# Patient Record
Sex: FEMALE | Race: WHITE | Employment: UNEMPLOYED | URBAN - METROPOLITAN AREA
[De-identification: names, ages, dates, MRNs, and addresses within clinical notes are randomized per-mention and may not be internally consistent; named-entity substitution may affect disease eponyms.]

---

## 2023-01-04 ENCOUNTER — PREP FOR PROCEDURE (OUTPATIENT)
Dept: SURGERY | Age: 28
End: 2023-01-04

## 2023-01-04 ENCOUNTER — OFFICE VISIT (OUTPATIENT)
Dept: SURGERY | Age: 28
End: 2023-01-04
Payer: COMMERCIAL

## 2023-01-04 VITALS
DIASTOLIC BLOOD PRESSURE: 80 MMHG | SYSTOLIC BLOOD PRESSURE: 138 MMHG | BODY MASS INDEX: 43.87 KG/M2 | HEART RATE: 91 BPM | WEIGHT: 257 LBS | OXYGEN SATURATION: 97 % | HEIGHT: 64 IN

## 2023-01-04 DIAGNOSIS — R22.2 MASS ON BACK: ICD-10-CM

## 2023-01-04 PROCEDURE — 99244 OFF/OP CNSLTJ NEW/EST MOD 40: CPT | Performed by: SURGERY

## 2023-01-04 NOTE — PROGRESS NOTES
H&P/Consult Note/Progress Note/Office Note:   Nithya Lemus  MRN: [id number]  :1995  Age:27 y.o.    HPI: Nithya Lemus is a 32 y.o. female who was referred by Dr. Miranda Renteria for evaluation of a back soft tissue mass. She reports the mass been present for a few years  It is growing significantly in size. Nothing in particular makes it better or worse. No prior excisions. No blood thinners          History reviewed. No pertinent past medical history. No past surgical history on file. No current outpatient medications on file. No current facility-administered medications for this visit. ALLERGIES:  Patient has no known allergies. Social History     Socioeconomic History    Marital status:      Spouse name: None    Number of children: None    Years of education: None    Highest education level: None   Tobacco Use    Smoking status: Never    Smokeless tobacco: Never     Social History     Tobacco Use   Smoking Status Never   Smokeless Tobacco Never     No family history on file. ROS: The patient has no difficulty with chest pain or shortness of breath. No fever or chills. Comprehensive review of systems was otherwise unremarkable except as noted above. Physical Exam:   /80   Pulse 91   Ht 5' 4\" (1.626 m)   Wt 257 lb (116.6 kg)   SpO2 97%   BMI 44.11 kg/m²   Vitals:    23 0903   BP: 138/80   Pulse: 91   SpO2: 97%   Weight: 257 lb (116.6 kg)   Height: 5' 4\" (1.626 m)     [unfilled]  [unfilled]    Constitutional: Alert, oriented, cooperative patient in no acute distress; appears stated age    Eyes:Sclera are clear. EOMs intact  ENMT: no external lesions gross hearing normal; no obvious neck masses, no ear or lip lesions, nares normal  CV: RRR. Normal perfusion  Resp: No JVD. Breathing is  non-labored; no audible wheezing.       Back soft tissue mass located along the right lateral mid back    GI: soft and non-distended     Musculoskeletal: unremarkable with normal function. No embolic signs or cyanosis. Neuro:  Oriented; moves all 4; no focal deficits  Psychiatric: normal affect and mood, no memory impairment    Recent vitals (if inpt):  @IPVITALS(24:)@    Amount and/or Complexity of Data Reviewed and Analyzed:  I reviewed and analyzed all of the unique labs and radiologic studies that are shown below as well as any that are in the HPI, and any that are in the expanded problem list below  *Each unique test, order, or document contributes to the combination of 2 or combination of 3 in Category 1 below. For this visit I also reviewed old records and prior notes. No results for input(s): WBC, HGB, PLT, NA, K, CL, CO2, BUN, CREA, GLU, INR, APTT, ALT, AML, AML, LCAD, PCO2, PO2, HCO3 in the last 72 hours. Invalid input(s): PTP, TBIL, TBILI, CBIL, SGOT, GPT, AP, LPSE, NH4, TROPT, TROIQ,  PH  Review of most recent CBC  No results found for: WBC, HGB, HCT, MCV, PLT    Review of most recent BMP  No results found for: NA, K, CL, CO2, BUN, CREATININE, GLUCOSE, CALCIUM     Review of most recent LFTs (and lipase if done)  No results found for: ALT, AST, GGT, ALKPHOS, BILITOT  No results found for: LIPASE    No results found for: INR, APTT, LCAD    Review of most recent HgbA1c  No results found for: LABA1C    Nutritional assessment screen for wound healing issues:  No results found for: LABPROT, LABALBU    @lastcovr@    Xray Result (most recent):  No results found for this or any previous visit from the past 3650 days. CT Result (most recent):  No results found for this or any previous visit from the past 3650 days. US Result (most recent):  No results found for this or any previous visit from the past 3650 days. Admission date (for inpatients): (Not on file)   * No surgery found *  * No surgery found *        ASSESSMENT/PLAN:  [unfilled]  Active Problems:    * No active hospital problems. *  Resolved Problems:    * No resolved hospital problems.  * Patient Active Problem List    Diagnosis Date Noted    Mass on back 01/04/2023     Priority: Medium          Number and Complexity of Problems addressed and   Risks of complications and/or morbidity of management        Back soft tissue mass treated along the right mid lateral back  Informed consent was obtained for excision of the back soft tissue mass  We will send this to pathology for review. Procedure risks were discussed including bleeding, infection, recurrence, seroma, reaction medications, etc.. All her questions were answered. She is in favor proceeding. We will schedule this for soon. Level of MDM (2/3 elements below)  Number and Complexity of Problems Addressed Amount and/or Complexity of Data to be Reviewed and Analyzed  *Each unique test, order, or document contributes to the combination of 2 or combination of 3 in Category 1 below. Risk of Complications and/or Morbidity or Mortality of pt Management     40036  51129 SF Minimal  ?1self-limited or minor problem Minimal or none Minimal risk of morbidity from additional diagnostic testing or Rx   80650  22993 Low Low  ? 2or more self-limited or minor problems;    or  ? 1stable chronic illness;    or  ?1acute, uncomplicated illness or injury   Limited  (Must meet the requirements of at least 1 of the 2 categories)  Category 1: Tests and documents   ? Any combination of 2 from the following:  ?Review of prior external note(s) from each unique source*;  ?review of the result(s) of each unique test*;   ?ordering of each unique test*    or   Category 2: Assessment requiring an independent historian(s)  (For the categories of independent interpretation of tests and discussion of management or test interpretation, see moderate or high) Low risk of morbidity from additional diagnostic testing or treatment     95269  41908 Mod Moderate  ? 1or more chronic illnesses with exacerbation, progression, or side effects of treatment;    or  ?2or more stable chronic illnesses;    or  ?1undiagnosed new problem with uncertain prognosis;    or  ?1acute illness with systemic symptoms;    or  ?1acute complicated injury   Moderate  (Must meet the requirements of at least 1 out of 3 categories)  Category 1: Tests, documents, or independent historian(s)  ? Any combination of 3 from the following:   ?Review of prior external note(s) from each unique source*;  ?Review of the result(s) of each unique test*;  ?Ordering of each unique test*;  ?Assessment requiring an independent historian(s)    or  Category 2: Independent interpretation of tests   ? Independent interpretation of a test performed by another physician/other qualified health care professional (not separately reported);     or  Category 3: Discussion of management or test interpretation  ? Discussion of management or test interpretation with external physician/other qualified health care professional/appropriate source (not separately reported)   Moderate risk of morbidity from additional diagnostic testing or treatment  Examples only:  ?Prescription drug management   ? Decision regarding minor surgery with identified patient or procedure risk factors  ? Decision regarding elective major surgery without identified patient or procedure risk factors   ? Diagnosis or treatment significantly limited by social determinants of health       11486  42506 High High  ? 1or more chronic illnesses with severe exacerbation, progression, or side effects of treatment;    or  ?1 acute or chronic illness or injury that poses a threat to life or bodily function   Extensive  (Must meet the requirements of at least 2 out of 3 categories)  Category 1: Tests, documents, or independent historian(s)  ? Any combination of 3 from the following:   ?Review of prior external note(s) from each unique source*;  ?Review of the result(s) of each unique test*;   ?Ordering of each unique test*;   ?Assessment requiring an independent historian(s)    or Category 2: Independent interpretation of tests   ? Independent interpretation of a test performed by another physician/other qualified health care professional (not separately reported);     or  Category 3: Discussion of management or test interpretation  ? Discussion of management or test interpretation with external physician/other qualified health care professional/appropriate source (not separately reported)   High risk of morbidity from additional diagnostic testing or treatment  Examples only:  ?Drug therapy requiring intensive monitoring for toxicity  ? Decision regarding elective major surgery with identified patient or procedure risk factors  ? Decision regarding emergency major surgery  ? Decision regarding hospitalization  ? Decision not to resuscitate or to de-escalate care because of poor prognosis             I have personally performed a face-to-face diagnostic evaluation and management  service on this patient. I have independently seen the patient. I have independently obtained the above history from the patient/family. I have independently examined the patient with above findings. I have independently reviewed data/labs for this patient and developed the above plan of care (MDM).       Signed: Joaquin Basilio MD  1/4/2023    9:13 AM

## 2023-01-05 RX ORDER — SODIUM CHLORIDE 9 MG/ML
INJECTION, SOLUTION INTRAVENOUS PRN
Status: CANCELLED | OUTPATIENT
Start: 2023-01-05

## 2023-01-05 RX ORDER — SODIUM CHLORIDE 0.9 % (FLUSH) 0.9 %
5-40 SYRINGE (ML) INJECTION EVERY 12 HOURS SCHEDULED
Status: CANCELLED | OUTPATIENT
Start: 2023-01-05

## 2023-01-05 RX ORDER — SODIUM CHLORIDE 0.9 % (FLUSH) 0.9 %
5-40 SYRINGE (ML) INJECTION PRN
Status: CANCELLED | OUTPATIENT
Start: 2023-01-05

## 2023-01-05 NOTE — PERIOP NOTE
Patient verified name and . Order for consent  found in EHR and matches case posting; patient verifies procedure. Type 1B surgery, PAT phone assessment complete. Orders  received. Labs per surgeon: none  Labs per anesthesia protocol: none indicated    Patient answered medical/surgical history questions at their best of ability. All prior to admission medications documented in [facility]. Patient instructed to take the following medications the day of surgery according to anesthesia guidelines with a small sip of water: none On the day before surgery please take Acetaminophen 1000mg in the morning and then again before bed. You may substitute for Tylenol 650 mg. Hold all vitamins 7 days prior to surgery and NSAIDS 5 days prior to surgery. Prescription meds to hold:multivitamin  Patient instructed on the following:    > Arrive at [facility], time of arrival to be called the day before by 1700  > NPO after midnight, unless otherwise indicated, including gum, mints, and ice chips  > Responsible adult must drive patient to the hospital, stay during surgery, and patient will need supervision 24 hours after anesthesia  > Use antibacterial soap in shower the night before surgery and on the morning of surgery  > All piercings must be removed prior to arrival.    > Leave all valuables (money and jewelry) at home but bring insurance card and ID on DOS.   > You may be required to pay a deductible or co-pay on the day of your procedure. You can pre-pay by calling [phone] if your surgery is at the [facility] or [phone] if your surgery is at the [facility]. > Do not wear make-up, nail polish, lotions, cologne, perfumes, powders, or oil on skin. Artificial nails are not permitted.

## 2023-01-09 ENCOUNTER — ANESTHESIA EVENT (OUTPATIENT)
Dept: SURGERY | Age: 28
End: 2023-01-09
Payer: COMMERCIAL

## 2023-01-10 ENCOUNTER — HOSPITAL ENCOUNTER (OUTPATIENT)
Age: 28
Setting detail: OUTPATIENT SURGERY
Discharge: HOME | End: 2023-01-10
Attending: SURGERY | Admitting: SURGERY
Payer: COMMERCIAL

## 2023-01-10 ENCOUNTER — ANESTHESIA (OUTPATIENT)
Dept: SURGERY | Age: 28
End: 2023-01-10
Payer: COMMERCIAL

## 2023-01-10 VITALS
WEIGHT: 257 LBS | BODY MASS INDEX: 43.87 KG/M2 | HEIGHT: 64 IN | DIASTOLIC BLOOD PRESSURE: 85 MMHG | OXYGEN SATURATION: 99 % | SYSTOLIC BLOOD PRESSURE: 138 MMHG | HEART RATE: 70 BPM | TEMPERATURE: 97 F | RESPIRATION RATE: 18 BRPM

## 2023-01-10 DIAGNOSIS — R22.2 MASS ON BACK: Primary | ICD-10-CM

## 2023-01-10 LAB — HCG UR QL: NEGATIVE

## 2023-01-10 PROCEDURE — 81025 URINE PREGNANCY TEST: CPT

## 2023-01-10 PROCEDURE — 3600000004 HC SURGERY LEVEL 4 BASE: Performed by: SURGERY

## 2023-01-10 PROCEDURE — 3600000014 HC SURGERY LEVEL 4 ADDTL 15MIN: Performed by: SURGERY

## 2023-01-10 PROCEDURE — 2580000003 HC RX 258

## 2023-01-10 PROCEDURE — 88304 TISSUE EXAM BY PATHOLOGIST: CPT

## 2023-01-10 PROCEDURE — 2709999900 HC NON-CHARGEABLE SUPPLY: Performed by: SURGERY

## 2023-01-10 PROCEDURE — 21931 EXC BACK LES SC 3 CM/>: CPT | Performed by: SURGERY

## 2023-01-10 PROCEDURE — 88305 TISSUE EXAM BY PATHOLOGIST: CPT

## 2023-01-10 PROCEDURE — 2580000003 HC RX 258: Performed by: ANESTHESIOLOGY

## 2023-01-10 PROCEDURE — 7100000010 HC PHASE II RECOVERY - FIRST 15 MIN: Performed by: SURGERY

## 2023-01-10 PROCEDURE — 6360000002 HC RX W HCPCS: Performed by: SURGERY

## 2023-01-10 PROCEDURE — 2500000003 HC RX 250 WO HCPCS: Performed by: SURGERY

## 2023-01-10 PROCEDURE — 99024 POSTOP FOLLOW-UP VISIT: CPT | Performed by: SURGERY

## 2023-01-10 PROCEDURE — 6370000000 HC RX 637 (ALT 250 FOR IP): Performed by: ANESTHESIOLOGY

## 2023-01-10 PROCEDURE — 7100000000 HC PACU RECOVERY - FIRST 15 MIN: Performed by: SURGERY

## 2023-01-10 PROCEDURE — 3700000000 HC ANESTHESIA ATTENDED CARE: Performed by: SURGERY

## 2023-01-10 PROCEDURE — 3700000001 HC ADD 15 MINUTES (ANESTHESIA): Performed by: SURGERY

## 2023-01-10 PROCEDURE — 6360000002 HC RX W HCPCS

## 2023-01-10 PROCEDURE — 6360000002 HC RX W HCPCS: Performed by: ANESTHESIOLOGY

## 2023-01-10 PROCEDURE — 2500000003 HC RX 250 WO HCPCS

## 2023-01-10 RX ORDER — PROPOFOL 10 MG/ML
INJECTION, EMULSION INTRAVENOUS PRN
Status: DISCONTINUED | OUTPATIENT
Start: 2023-01-10 | End: 2023-01-10 | Stop reason: SDUPTHER

## 2023-01-10 RX ORDER — MIDAZOLAM HYDROCHLORIDE 2 MG/2ML
2 INJECTION, SOLUTION INTRAMUSCULAR; INTRAVENOUS
Status: COMPLETED | OUTPATIENT
Start: 2023-01-10 | End: 2023-01-10

## 2023-01-10 RX ORDER — HYDROMORPHONE HYDROCHLORIDE 2 MG/ML
0.5 INJECTION, SOLUTION INTRAMUSCULAR; INTRAVENOUS; SUBCUTANEOUS
Status: DISCONTINUED | OUTPATIENT
Start: 2023-01-10 | End: 2023-01-10 | Stop reason: HOSPADM

## 2023-01-10 RX ORDER — SODIUM CHLORIDE 0.9 % (FLUSH) 0.9 %
5-40 SYRINGE (ML) INJECTION EVERY 12 HOURS SCHEDULED
Status: DISCONTINUED | OUTPATIENT
Start: 2023-01-10 | End: 2023-01-10 | Stop reason: HOSPADM

## 2023-01-10 RX ORDER — LIDOCAINE HYDROCHLORIDE 10 MG/ML
1 INJECTION, SOLUTION INFILTRATION; PERINEURAL
Status: DISCONTINUED | OUTPATIENT
Start: 2023-01-10 | End: 2023-01-10 | Stop reason: HOSPADM

## 2023-01-10 RX ORDER — SODIUM CHLORIDE, SODIUM LACTATE, POTASSIUM CHLORIDE, CALCIUM CHLORIDE 600; 310; 30; 20 MG/100ML; MG/100ML; MG/100ML; MG/100ML
INJECTION, SOLUTION INTRAVENOUS CONTINUOUS
Status: DISCONTINUED | OUTPATIENT
Start: 2023-01-10 | End: 2023-01-10 | Stop reason: HOSPADM

## 2023-01-10 RX ORDER — SODIUM CHLORIDE 9 MG/ML
INJECTION, SOLUTION INTRAVENOUS PRN
Status: DISCONTINUED | OUTPATIENT
Start: 2023-01-10 | End: 2023-01-10 | Stop reason: SDUPTHER

## 2023-01-10 RX ORDER — ALBUTEROL SULFATE 2.5 MG/3ML
2.5 SOLUTION RESPIRATORY (INHALATION)
Status: DISCONTINUED | OUTPATIENT
Start: 2023-01-10 | End: 2023-01-10 | Stop reason: HOSPADM

## 2023-01-10 RX ORDER — LIDOCAINE HYDROCHLORIDE 10 MG/ML
INJECTION, SOLUTION INFILTRATION; PERINEURAL PRN
Status: DISCONTINUED | OUTPATIENT
Start: 2023-01-10 | End: 2023-01-10 | Stop reason: HOSPADM

## 2023-01-10 RX ORDER — ACETAMINOPHEN 500 MG
1000 TABLET ORAL ONCE
Status: COMPLETED | OUTPATIENT
Start: 2023-01-10 | End: 2023-01-10

## 2023-01-10 RX ORDER — OXYCODONE HYDROCHLORIDE 5 MG/1
10 TABLET ORAL PRN
Status: DISCONTINUED | OUTPATIENT
Start: 2023-01-10 | End: 2023-01-10 | Stop reason: HOSPADM

## 2023-01-10 RX ORDER — LABETALOL HYDROCHLORIDE 5 MG/ML
10 INJECTION, SOLUTION INTRAVENOUS
Status: DISCONTINUED | OUTPATIENT
Start: 2023-01-10 | End: 2023-01-10 | Stop reason: HOSPADM

## 2023-01-10 RX ORDER — OXYCODONE HYDROCHLORIDE 5 MG/1
5 TABLET ORAL PRN
Status: DISCONTINUED | OUTPATIENT
Start: 2023-01-10 | End: 2023-01-10 | Stop reason: HOSPADM

## 2023-01-10 RX ORDER — ONDANSETRON 2 MG/ML
INJECTION INTRAMUSCULAR; INTRAVENOUS PRN
Status: DISCONTINUED | OUTPATIENT
Start: 2023-01-10 | End: 2023-01-10 | Stop reason: SDUPTHER

## 2023-01-10 RX ORDER — HYDROMORPHONE HCL 110MG/55ML
0.5 PATIENT CONTROLLED ANALGESIA SYRINGE INTRAVENOUS EVERY 5 MIN PRN
Status: DISCONTINUED | OUTPATIENT
Start: 2023-01-10 | End: 2023-01-10 | Stop reason: HOSPADM

## 2023-01-10 RX ORDER — ONDANSETRON 2 MG/ML
4 INJECTION INTRAMUSCULAR; INTRAVENOUS
Status: DISCONTINUED | OUTPATIENT
Start: 2023-01-10 | End: 2023-01-10 | Stop reason: HOSPADM

## 2023-01-10 RX ORDER — LIDOCAINE HYDROCHLORIDE 20 MG/ML
INJECTION, SOLUTION EPIDURAL; INFILTRATION; INTRACAUDAL; PERINEURAL PRN
Status: DISCONTINUED | OUTPATIENT
Start: 2023-01-10 | End: 2023-01-10 | Stop reason: SDUPTHER

## 2023-01-10 RX ORDER — SODIUM CHLORIDE 0.9 % (FLUSH) 0.9 %
5-40 SYRINGE (ML) INJECTION PRN
Status: DISCONTINUED | OUTPATIENT
Start: 2023-01-10 | End: 2023-01-10 | Stop reason: HOSPADM

## 2023-01-10 RX ORDER — DEXAMETHASONE SODIUM PHOSPHATE 10 MG/ML
INJECTION INTRAMUSCULAR; INTRAVENOUS PRN
Status: DISCONTINUED | OUTPATIENT
Start: 2023-01-10 | End: 2023-01-10 | Stop reason: SDUPTHER

## 2023-01-10 RX ORDER — SODIUM CHLORIDE 9 MG/ML
INJECTION, SOLUTION INTRAVENOUS PRN
Status: DISCONTINUED | OUTPATIENT
Start: 2023-01-10 | End: 2023-01-10 | Stop reason: HOSPADM

## 2023-01-10 RX ORDER — DEXTROSE MONOHYDRATE 100 MG/ML
INJECTION, SOLUTION INTRAVENOUS CONTINUOUS PRN
Status: DISCONTINUED | OUTPATIENT
Start: 2023-01-10 | End: 2023-01-10 | Stop reason: HOSPADM

## 2023-01-10 RX ORDER — IPRATROPIUM BROMIDE AND ALBUTEROL SULFATE 2.5; .5 MG/3ML; MG/3ML
1 SOLUTION RESPIRATORY (INHALATION)
Status: DISCONTINUED | OUTPATIENT
Start: 2023-01-10 | End: 2023-01-10 | Stop reason: HOSPADM

## 2023-01-10 RX ORDER — HYDROCODONE BITARTRATE AND ACETAMINOPHEN 5; 325 MG/1; MG/1
1-2 TABLET ORAL EVERY 8 HOURS PRN
Qty: 28 TABLET | Refills: 0 | Status: SHIPPED | OUTPATIENT
Start: 2023-01-10 | End: 2023-01-17

## 2023-01-10 RX ORDER — HYDROMORPHONE HCL 110MG/55ML
0.25 PATIENT CONTROLLED ANALGESIA SYRINGE INTRAVENOUS EVERY 5 MIN PRN
Status: DISCONTINUED | OUTPATIENT
Start: 2023-01-10 | End: 2023-01-10 | Stop reason: HOSPADM

## 2023-01-10 RX ORDER — HYDROMORPHONE HYDROCHLORIDE 2 MG/ML
0.25 INJECTION, SOLUTION INTRAMUSCULAR; INTRAVENOUS; SUBCUTANEOUS
Status: DISCONTINUED | OUTPATIENT
Start: 2023-01-10 | End: 2023-01-10 | Stop reason: HOSPADM

## 2023-01-10 RX ADMIN — Medication 2000 MG: at 12:38

## 2023-01-10 RX ADMIN — PROPOFOL 200 MG: 10 INJECTION, EMULSION INTRAVENOUS at 12:31

## 2023-01-10 RX ADMIN — SODIUM CHLORIDE, POTASSIUM CHLORIDE, SODIUM LACTATE AND CALCIUM CHLORIDE: 600; 310; 30; 20 INJECTION, SOLUTION INTRAVENOUS at 11:27

## 2023-01-10 RX ADMIN — MIDAZOLAM HYDROCHLORIDE 2 MG: 1 INJECTION, SOLUTION INTRAMUSCULAR; INTRAVENOUS at 12:26

## 2023-01-10 RX ADMIN — FENTANYL CITRATE 50 MCG: 50 INJECTION INTRAMUSCULAR; INTRAVENOUS at 12:29

## 2023-01-10 RX ADMIN — DEXAMETHASONE SODIUM PHOSPHATE 4 MG: 10 INJECTION INTRAMUSCULAR; INTRAVENOUS at 12:50

## 2023-01-10 RX ADMIN — PHENYLEPHRINE HYDROCHLORIDE 100 MCG: 10 INJECTION INTRAVENOUS at 12:41

## 2023-01-10 RX ADMIN — ONDANSETRON 4 MG: 2 INJECTION INTRAMUSCULAR; INTRAVENOUS at 12:50

## 2023-01-10 RX ADMIN — ACETAMINOPHEN 1000 MG: 500 TABLET ORAL at 11:28

## 2023-01-10 RX ADMIN — LIDOCAINE HYDROCHLORIDE 100 MG: 20 INJECTION, SOLUTION EPIDURAL; INFILTRATION; INTRACAUDAL; PERINEURAL at 12:31

## 2023-01-10 RX ADMIN — FENTANYL CITRATE 50 MCG: 50 INJECTION INTRAMUSCULAR; INTRAVENOUS at 12:48

## 2023-01-10 ASSESSMENT — PAIN - FUNCTIONAL ASSESSMENT: PAIN_FUNCTIONAL_ASSESSMENT: 0-10

## 2023-01-10 NOTE — DISCHARGE INSTRUCTIONS
Dressings/Wound Care  OK to leave your incisional dressings alone until follow-up visit. Try to keep incisions as dry as possible to lower risk of infection. Activity  No heavy lifting (>5lbs) for 6 weeks to reduce risk of developing a hernia in the incisions. No driving until you are off pain meds for 24hrs and have no pain with movements associated with driving. Pain prescription (Norco) electronically sent to your pharmacy    Follow-up with Dr Juan Antonio Alexandre nurse practitioner Tootie Handley in 13 days in the office on a Monday. See Dr Terrell Ron as needed after that visit. [address], [address]  (Call for an appt time unless one is already made for you by discharge nurse which is preferred -->[phone])    Diet  Resume Regular Diet     MEDICATION INTERACTION:    During your procedure you potentially received a medication or medications which may reduce the effectiveness of oral contraceptives. Please consider other forms of contraception for 1 month following your procedure if you are currently using oral contraceptives as your primary form of birth control. In addition to this, we recommend continuing your oral contraceptive as prescribed, unless otherwise instructed by your physician, during this time. ACTIVITY  As tolerated and as directed by your doctor. DIET  Clear liquids until no nausea or vomiting, then light diet for the first day. Advance to regular diet on second day, unless your doctor orders otherwise. If nausea and vomiting continues, call your doctor. PAIN  Take pain medication as directed by your doctor. Call your doctor if pain is NOT relieved by medication. CALL YOUR DOCTOR IF   Excessive bleeding that does not stop after holding pressure over the area. Temperature of 101 degrees F or above. Excessive redness, swelling or bruising, and/or green or yellow, smelly discharge from incision.     After general anesthesia or intravenous sedation, for 24 hours or while taking prescription Narcotics:  Limit your activities  A responsible adult needs to be with you for the next 24 hours  Do not drive and operate hazardous machinery  Do not make important personal or business decisions  Do not drink alcoholic beverages  If you have not urinated within 8 hours after discharge, and you are experiencing discomfort from urinary retention, please go to the nearest ED. If you have sleep apnea and have a CPAP machine, please use it for all naps and sleeping. Please use caution when taking narcotics and any of your home medications that may cause drowsiness. *  Please give a list of your current medications to your Primary Care Provider. *  Please update this list whenever your medications are discontinued, doses are      changed, or new medications (including over-the-counter products) are added. *  Please carry medication information at all times in case of emergency situations. These are general instructions for a healthy lifestyle:  No smoking/ No tobacco products/ Avoid exposure to second hand smoke  Surgeon General's Warning:  Quitting smoking now greatly reduces serious risk to your health. Obesity, smoking, and sedentary lifestyle greatly increases your risk for illness  A healthy diet, regular physical exercise & weight monitoring are important for maintaining a healthy lifestyle    You may be retaining fluid if you have a history of heart failure or if you experience any of the following symptoms:  Weight gain of 3 pounds or more overnight or 5 pounds in a week, increased swelling in our hands or feet or shortness of breath while lying flat in bed. Please call your doctor as soon as you notice any of these symptoms; do not wait until your next office visit.

## 2023-01-10 NOTE — OP NOTE
[facility]  OPERATIVE REPORT    Name:  Chad Peters  MR#:  [id number]  :  1995  ACCOUNT #:  [de-identified]  DATE OF SERVICE:  01/10/2023    PREOPERATIVE DIAGNOSIS:  Soft tissue mass, right lateral back/flank. POSTOPERATIVE DIAGNOSIS:  Soft tissue mass, right lateral back/flank. PROCEDURE PERFORMED:  Excision of subcutaneous soft tissue mass of the back, 4 cm (CPT 84907). SURGEON:  Carlita Higgins MD    ASSISTANT:  None. ANESTHESIA:  General.    COMPLICATIONS:  None. SPECIMENS REMOVED:  Sent to Pathology for review. IMPLANTS:  None. ESTIMATED BLOOD LOSS:  Less than 5 mL. PROCEDURE:  The patient was taken to the operating room, placed in supine position. After adequate anesthesia was given, she was repositioned with her right side up on her flank with a bean bag. Time-out protocol was followed. She was given prophylactic antibiotics. Her right flank and lateral back were prepped and draped in sterile fashion with multiple layers of ChloraPrep. The ChloraPrep was allowed to dry. The patient was opened through an oblique incision over the soft tissue mass. The soft tissue mass was 4 cm in size and completely dissected from the subcutaneous space. It was removed intact and sent to Pathology for review. Irrigation was used. Hemostasis was confirmed. There was no evidence of gross or palpable pathology following the excision. The mass was clinically consistent with lipoma. A local anesthetic was given consisting of 0.25% Marcaine. Hemostasis was confirmed. Irrigation was used. The incision was closed in layers with a deep layer of interrupted 3-0 Vicryl sutures. The skin was closed with clips. Sterile dressing was placed. The patient tolerated the procedure well. There were no immediate complications.       Shreya White MD [id number]  D:  01/10/2023 13:06  T:  01/10/2023 13:33  JOB #:  [id number]

## 2023-01-10 NOTE — ANESTHESIA POSTPROCEDURE EVALUATION
Department of Anesthesiology  Postprocedure Note    Patient: Yair Burr  MRN: [id number]  YOB: 1995  Date of evaluation: 1/10/2023      Procedure Summary     Date: 01/10/23 Room / Location:  [facility] OR 01 / [facility]    Anesthesia Start: 1218 Anesthesia Stop: 3876    Procedure: EXCISION RIGHT BACK MASS (Right: Back) Diagnosis:       Mass of skin of back      (Mass of skin of back [R22.2])    Surgeons: Rajan Cohen MD Responsible Provider: Kelly Yi MD    Anesthesia Type: general ASA Status: 3          Anesthesia Type: No value filed.     Chelsey Phase I: Chelsey Score: 9    Chelsey Phase II:        Anesthesia Post Evaluation    Patient location during evaluation: PACU  Patient participation: complete - patient participated  Level of consciousness: awake and alert  Pain score: 0  Airway patency: patent  Nausea & Vomiting: no nausea and no vomiting  Complications: no  Cardiovascular status: hemodynamically stable  Respiratory status: acceptable, nonlabored ventilation and spontaneous ventilation  Hydration status: euvolemic  Comments: BP (!) 156/93   Pulse 89   Temp 97 °F (36.1 °C) (Skin)   Resp 18   Ht 5' 4\" (1.626 m)   Wt 257 lb (116.6 kg)   LMP 11/24/2022   SpO2 98%   BMI 44.11 kg/m²     Multimodal analgesia pain management approach

## 2023-01-10 NOTE — ANESTHESIA PROCEDURE NOTES
Airway  Date/Time: 1/10/2023 12:34 PM  Urgency: elective    Airway not difficult    General Information and Staff    Patient location during procedure: OR  Performed: resident/CRNA     Indications and Patient Condition  Indications for airway management: anesthesia  Spontaneous ventilation: present  Sedation level: deep  Preoxygenated: yes  Patient position: sniffing  MILS not maintained throughout  Mask difficulty assessment: vent by bag mask    Final Airway Details  Final airway type: supraglottic airway      Successful airway: oropharyngeal  Size 4     Number of attempts at approach: 1  Ventilation between attempts: supraglottic airway  Number of other approaches attempted: 0    no

## 2023-01-10 NOTE — H&P
H&P/Consult Note/Progress Note/Office Note:   Alondra Kruger  MRN: [id number]  PWQ:5/41/8555  Age:27 y.o.    HPI: Alondra Kruger is a 32 y.o. female who is here for excision of a soft tissue mass of the back on 1/10/23. Prior to excision she was referred by Dr. Tate Ortega for evaluation of a back soft tissue mass. She reported the mass been present for a few years  It was growing significantly in size. Nothing in particular made it better or worse. No prior excisions. No blood thinners          Past Medical History:   Diagnosis Date    Adverse effect of anesthesia     woke up during wisdom teeth removal     Past Surgical History:   Procedure Laterality Date    WISDOM TOOTH EXTRACTION  2012     No current facility-administered medications for this encounter. Current Outpatient Medications   Medication Sig    Multiple Vitamins-Minerals (MULTIVITAMIN WOMEN PO) Take by mouth daily     ALLERGIES:  Patient has no known allergies. Social History     Socioeconomic History    Marital status:      Spouse name: None    Number of children: None    Years of education: None    Highest education level: None   Tobacco Use    Smoking status: Former    Smokeless tobacco: Never    Tobacco comments:     Quit vaping   Substance and Sexual Activity    Alcohol use: Not Currently    Drug use: Never     Social History     Tobacco Use   Smoking Status Former   Smokeless Tobacco Never   Tobacco Comments    Quit vaping     History reviewed. No pertinent family history. ROS: The patient has no difficulty with chest pain or shortness of breath. No fever or chills. Comprehensive review of systems was otherwise unremarkable except as noted above.     Physical Exam:   Ht 5' 4\" (1.626 m)   Wt 257 lb (116.6 kg)   LMP 11/24/2022   BMI 44.11 kg/m²   Vitals:    01/05/23 1120   Weight: 257 lb (116.6 kg)   Height: 5' 4\" (1.626 m)     [unfilled]  [unfilled]    Constitutional: Alert, oriented, cooperative patient in no acute distress; appears stated age    Eyes:Sclera are clear. EOMs intact  ENMT: no external lesions gross hearing normal; no obvious neck masses, no ear or lip lesions, nares normal  CV: RRR. Normal perfusion  Resp: No JVD. Breathing is  non-labored; no audible wheezing. Back soft tissue mass located along the right lateral mid back    GI: soft and non-distended     Musculoskeletal: unremarkable with normal function. No embolic signs or cyanosis. Neuro:  Oriented; moves all 4; no focal deficits  Psychiatric: normal affect and mood, no memory impairment    Recent vitals (if inpt):  @IPVITALS(24:)@    Amount and/or Complexity of Data Reviewed and Analyzed:  I reviewed and analyzed all of the unique labs and radiologic studies that are shown below as well as any that are in the HPI, and any that are in the expanded problem list below  *Each unique test, order, or document contributes to the combination of 2 or combination of 3 in Category 1 below. For this visit I also reviewed old records and prior notes. No results for input(s): WBC, HGB, PLT, NA, K, CL, CO2, BUN, CREA, GLU, INR, APTT, ALT, AML, AML, LCAD, PCO2, PO2, HCO3 in the last 72 hours. Invalid input(s): PTP, TBIL, TBILI, CBIL, SGOT, GPT, AP, LPSE, NH4, TROPT, TROIQ,  PH  Review of most recent CBC  No results found for: WBC, HGB, HCT, MCV, PLT    Review of most recent BMP  No results found for: NA, K, CL, CO2, BUN, CREATININE, GLUCOSE, CALCIUM     Review of most recent LFTs (and lipase if done)  No results found for: ALT, AST, GGT, ALKPHOS, BILITOT  No results found for: LIPASE    No results found for: INR, APTT, LCAD    Review of most recent HgbA1c  No results found for: LABA1C    Nutritional assessment screen for wound healing issues:  No results found for: LABPROT, LABALBU    @lastcovr@    Xray Result (most recent):  No results found for this or any previous visit from the past 3650 days.     CT Result (most recent):  No results found for this or any previous visit from the past 3650 days. US Result (most recent):  No results found for this or any previous visit from the past 3650 days. Admission date (for inpatients): (Not on file)   * No surgery date entered *  * No surgery found *        ASSESSMENT/PLAN:  [unfilled]  Principal Problem: Mass on back  Resolved Problems:    * No resolved hospital problems. *     Patient Active Problem List    Diagnosis Date Noted    Mass on back 01/04/2023     Priority: Medium          Number and Complexity of Problems addressed and   Risks of complications and/or morbidity of management        Back soft tissue mass along the right mid lateral back  She is here for excision of a soft tissue mass of the back on 1/10/23  We will send to specimen to path for review      Informed consent was obtained for excision of the back soft tissue mass  We will send this to pathology for review. Procedure risks were discussed including bleeding, infection, recurrence, seroma, reaction medications, etc.. All her questions were answered. She is in favor proceeding. Level of MDM (2/3 elements below)  Number and Complexity of Problems Addressed Amount and/or Complexity of Data to be Reviewed and Analyzed  *Each unique test, order, or document contributes to the combination of 2 or combination of 3 in Category 1 below. Risk of Complications and/or Morbidity or Mortality of pt Management     89526  18944 SF Minimal  ?1self-limited or minor problem Minimal or none Minimal risk of morbidity from additional diagnostic testing or Rx   14836  87515 Low Low  ? 2or more self-limited or minor problems;    or  ? 1stable chronic illness;    or  ?1acute, uncomplicated illness or injury   Limited  (Must meet the requirements of at least 1 of the 2 categories)  Category 1: Tests and documents   ? Any combination of 2 from the following:  ?Review of prior external note(s) from each unique source*;  ?review of the result(s) of each unique test*;   ?ordering of each unique test*    or   Category 2: Assessment requiring an independent historian(s)  (For the categories of independent interpretation of tests and discussion of management or test interpretation, see moderate or high) Low risk of morbidity from additional diagnostic testing or treatment     29617  87164 Mod Moderate  ? 1or more chronic illnesses with exacerbation, progression, or side effects of treatment;    or  ?2or more stable chronic illnesses;    or  ?1undiagnosed new problem with uncertain prognosis;    or  ?1acute illness with systemic symptoms;    or  ?1acute complicated injury   Moderate  (Must meet the requirements of at least 1 out of 3 categories)  Category 1: Tests, documents, or independent historian(s)  ? Any combination of 3 from the following:   ?Review of prior external note(s) from each unique source*;  ?Review of the result(s) of each unique test*;  ?Ordering of each unique test*;  ?Assessment requiring an independent historian(s)    or  Category 2: Independent interpretation of tests   ? Independent interpretation of a test performed by another physician/other qualified health care professional (not separately reported);     or  Category 3: Discussion of management or test interpretation  ? Discussion of management or test interpretation with external physician/other qualified health care professional/appropriate source (not separately reported)   Moderate risk of morbidity from additional diagnostic testing or treatment  Examples only:  ?Prescription drug management   ? Decision regarding minor surgery with identified patient or procedure risk factors  ? Decision regarding elective major surgery without identified patient or procedure risk factors   ? Diagnosis or treatment significantly limited by social determinants of health       05571  72346 High High  ? 1or more chronic illnesses with severe exacerbation, progression, or side effects of treatment;    or  ?1 acute or chronic illness or injury that poses a threat to life or bodily function   Extensive  (Must meet the requirements of at least 2 out of 3 categories)  Category 1: Tests, documents, or independent historian(s)  ? Any combination of 3 from the following:   ?Review of prior external note(s) from each unique source*;  ?Review of the result(s) of each unique test*;   ?Ordering of each unique test*;   ?Assessment requiring an independent historian(s)    or   Category 2: Independent interpretation of tests   ? Independent interpretation of a test performed by another physician/other qualified health care professional (not separately reported);     or  Category 3: Discussion of management or test interpretation  ? Discussion of management or test interpretation with external physician/other qualified health care professional/appropriate source (not separately reported)   High risk of morbidity from additional diagnostic testing or treatment  Examples only:  ?Drug therapy requiring intensive monitoring for toxicity  ? Decision regarding elective major surgery with identified patient or procedure risk factors  ? Decision regarding emergency major surgery  ? Decision regarding hospitalization  ? Decision not to resuscitate or to de-escalate care because of poor prognosis             I have personally performed a face-to-face diagnostic evaluation and management  service on this patient. I have independently seen the patient. I have independently obtained the above history from the patient/family. I have independently examined the patient with above findings. I have independently reviewed data/labs for this patient and developed the above plan of care (MDM).       Signed: Charley Holguin MD  1/10/2023    7:59 AM

## 2023-01-10 NOTE — ANESTHESIA PRE PROCEDURE
Department of Anesthesiology  Preprocedure Note       Name:  Duran Lee   Age:  32 y.o.  :  1995                                          MRN:  [id number]         Date:  1/10/2023      Surgeon: Chery Barclay):  Olimpia Silva MD    Procedure: Procedure(s):  EXCISION RIGHT BACK MASS    Medications prior to admission:   Prior to Admission medications    Medication Sig Start Date End Date Taking? Authorizing Provider   HYDROcodone-acetaminophen (NORCO) 5-325 MG per tablet Take 1-2 tablets by mouth every 8 hours as needed for Pain for up to 7 days.  Max Daily Amount: 6 tablets 1/10/23 1/17/23 Yes Olimpia Silva MD   Multiple Vitamins-Minerals (MULTIVITAMIN WOMEN PO) Take by mouth daily   Yes Historical Provider, MD       Current medications:    Current Facility-Administered Medications   Medication Dose Route Frequency Provider Last Rate Last Admin    lidocaine 1 % injection 1 mL  1 mL IntraDERmal Once PRN Jake Romero MD        HYDROmorphone HCl PF (DILAUDID) injection 0.25 mg  0.25 mg IntraVENous Once PRN Jake Romero MD        Or    HYDROmorphone HCl PF (DILAUDID) injection 0.5 mg  0.5 mg IntraVENous Once PRN Jake Romero MD        lactated ringers infusion   IntraVENous Continuous Jake Romero  mL/hr at 01/10/23 1127 New Bag at 01/10/23 1127    sodium chloride flush 0.9 % injection 5-40 mL  5-40 mL IntraVENous 2 times per day Jake Romero MD        sodium chloride flush 0.9 % injection 5-40 mL  5-40 mL IntraVENous PRN Jake Romero MD        0.9 % sodium chloride infusion   IntraVENous PRN Jake Romero MD        midazolam PF (VERSED) injection 2 mg  2 mg IntraVENous Once PRN Jake Romero MD        albuterol (PROVENTIL) nebulizer solution 2.5 mg  2.5 mg Nebulization Q2H PRN Jake Romero MD        sodium chloride flush 0.9 % injection 5-40 mL  5-40 mL IntraVENous 2 times per day Olimpia Silva MD        sodium chloride flush 0.9 % injection 5-40 mL 5-40 mL IntraVENous PRN Elisa Mosquera MD        ceFAZolin (ANCEF) 2000 mg in sterile water 20 mL IV syringe  2,000 mg IntraVENous Once Elisa Mosquera MD           Allergies:  No Known Allergies    Problem List:    Patient Active Problem List   Diagnosis Code    Mass on back R22.2       Past Medical History:        Diagnosis Date    Adverse effect of anesthesia     woke up during wisdom teeth removal       Past Surgical History:        Procedure Laterality Date    WISDOM TOOTH EXTRACTION  2012       Social History:    Social History     Tobacco Use    Smoking status: Former    Smokeless tobacco: Never    Tobacco comments:     Quit vaping   Substance Use Topics    Alcohol use: Not Currently                                Counseling given: Not Answered  Tobacco comments: Quit vaping      Vital Signs (Current):   Vitals:    01/05/23 1120   Weight: 257 lb (116.6 kg)   Height: 5' 4\" (1.626 m)                                              BP Readings from Last 3 Encounters:   01/04/23 138/80       NPO Status: Time of last liquid consumption: 2200                        Time of last solid consumption: 2200                        Date of last liquid consumption: 01/09/23                        Date of last solid food consumption: 01/09/23    BMI:   Wt Readings from Last 3 Encounters:   01/05/23 257 lb (116.6 kg)   01/04/23 257 lb (116.6 kg)     Body mass index is 44.11 kg/m². CBC: No results found for: WBC, RBC, HGB, HCT, MCV, RDW, PLT    CMP: No results found for: NA, K, CL, CO2, BUN, CREATININE, GFRAA, AGRATIO, LABGLOM, GLUCOSE, GLU, PROT, CALCIUM, BILITOT, ALKPHOS, AST, ALT    POC Tests: No results for input(s): POCGLU, POCNA, POCK, POCCL, POCBUN, POCHEMO, POCHCT in the last 72 hours.     Coags: No results found for: PROTIME, INR, APTT    HCG (If Applicable):   Lab Results   Component Value Date    PREGTESTUR Negative 01/10/2023        ABGs: No results found for: PHART, PO2ART, EWM4VTQ, ZOP8IMW, BEART, [id number]     Type & Screen (If Applicable):  No results found for: LABABO, LABRH    Drug/Infectious Status (If Applicable):  No results found for: HIV, HEPCAB    COVID-19 Screening (If Applicable): No results found for: COVID19        Anesthesia Evaluation  Patient summary reviewed and Nursing notes reviewed history of anesthetic complications (woke up during wisdom teeth removal): Airway: Mallampati: II  TM distance: >3 FB   Neck ROM: full  Mouth opening: > = 3 FB   Dental: normal exam         Pulmonary:Negative Pulmonary ROS breath sounds clear to auscultation                             Cardiovascular:Negative CV ROS  Exercise tolerance: good (>4 METS),           Rhythm: regular  Rate: normal                    Neuro/Psych:   Negative Neuro/Psych ROS              GI/Hepatic/Renal:   (+) morbid obesity          Endo/Other: Negative Endo/Other ROS                     ROS comment: Mass on back Abdominal:             Vascular: negative vascular ROS. Other Findings:           Anesthesia Plan      general     ASA 3     (LMA ok in lateral position )  Induction: intravenous. MIPS: Postoperative opioids intended and Prophylactic antiemetics administered. Anesthetic plan and risks discussed with patient (Significant other). Plan discussed with CRNA.                     Alexx Hammer MD   1/10/2023

## 2023-01-23 ENCOUNTER — OFFICE VISIT (OUTPATIENT)
Dept: SURGERY | Age: 28
End: 2023-01-23

## 2023-01-23 DIAGNOSIS — R22.2 MASS ON BACK: Primary | ICD-10-CM

## 2023-01-23 NOTE — PROGRESS NOTES
H&P/Consult Note/Progress Note/Office Note:   Sherrian Epley  MRN: [id number]  :1995  Age:27 y.o.    HPI: Sherrian Epley is a 32 y.o. female who is S/P excision of a soft tissue mass of the back on 1/10/23. Pathology demonstrates Lipoma. Prior to excision she was referred by Dr. Beatrice Madera for evaluation of a back soft tissue mass. She reported the mass been present for a few years  It was growing significantly in size. Nothing in particular made it better or worse. No prior excisions. No blood thinners          Past Medical History:   Diagnosis Date    Adverse effect of anesthesia     woke up during wisdom teeth removal     Past Surgical History:   Procedure Laterality Date    ABDOMEN SURGERY Right 1/10/2023    EXCISION RIGHT BACK MASS performed by Abbey Zuleta MD at [address] [facility] [place] EXTRACTION  2012     Current Outpatient Medications   Medication Sig    Multiple Vitamins-Minerals (MULTIVITAMIN WOMEN PO) Take by mouth daily     No current facility-administered medications for this visit. ALLERGIES:  Patient has no known allergies. Social History     Socioeconomic History    Marital status:    Tobacco Use    Smoking status: Former    Smokeless tobacco: Never    Tobacco comments:     Quit vaping   Substance and Sexual Activity    Alcohol use: Not Currently    Drug use: Never     Social History     Tobacco Use   Smoking Status Former   Smokeless Tobacco Never   Tobacco Comments    Quit vaping     No family history on file. ROS: The patient has no difficulty with chest pain or shortness of breath. No fever or chills. Comprehensive review of systems was otherwise unremarkable except as noted above. Physical Exam:   There were no vitals taken for this visit. There were no vitals filed for this visit. Constitutional: Alert, oriented, cooperative patient in no acute distress; appears stated age    Eyes:Sclera are clear.  EOMs intact  ENMT: no external lesions gross hearing normal; no obvious neck masses, no ear or lip lesions, nares normal  CV: RRR. Normal perfusion  Resp: No JVD. Breathing is  non-labored; no audible wheezing. Surgical staple line located along the right lateral mid back well healed. Staples removed. Steri strips and tegaderm applied. No evidence of cellulitis     GI: soft and non-distended     Musculoskeletal: unremarkable with normal function. No embolic signs or cyanosis. Neuro:  Oriented; moves all 4; no focal deficits  Psychiatric: normal affect and mood, no memory impairment    Recent vitals (if inpt):  @IPVITALS(24:)@    Amount and/or Complexity of Data Reviewed and Analyzed:  I reviewed and analyzed all of the unique labs and radiologic studies that are shown below as well as any that are in the HPI, and any that are in the expanded problem list below  *Each unique test, order, or document contributes to the combination of 2 or combination of 3 in Category 1 below. For this visit I also reviewed old records and prior notes. No results for input(s): WBC, HGB, PLT, NA, K, CL, CO2, BUN, CREA, GLU, INR, APTT, ALT, AML, AML, LCAD, PCO2, PO2, HCO3 in the last 72 hours. Invalid input(s): PTP, TBIL, TBILI, CBIL, SGOT, GPT, AP, LPSE, NH4, TROPT, TROIQ,  PH  Review of most recent CBC  No results found for: WBC, HGB, HCT, MCV, PLT    Review of most recent BMP  No results found for: NA, K, CL, CO2, BUN, CREATININE, GLUCOSE, CALCIUM     Review of most recent LFTs (and lipase if done)  No results found for: ALT, AST, GGT, ALKPHOS, BILITOT  No results found for: LIPASE    No results found for: INR, APTT, LCAD    Review of most recent HgbA1c  No results found for: LABA1C    Nutritional assessment screen for wound healing issues:  No results found for: LABPROT, LABALBU        Xray Result (most recent):  No results found for this or any previous visit from the past 3650 days.     CT Result (most recent):  No results found for this or any previous visit from the past 3650 days. US Result (most recent):  No results found for this or any previous visit from the past 3650 days. Admission date (for inpatients): (Not on file)   * No surgery found *  * No surgery found *        ASSESSMENT/PLAN:  [unfilled]  Active Problems:    * No active hospital problems. *  Resolved Problems:    * No resolved hospital problems. *     Patient Active Problem List    Diagnosis Date Noted    Mass on back 01/04/2023     Priority: Medium     1/10/23 s/p back mass excision; Dr Rita Sears  DIAGNOSIS   A:  \" RIGHT FLANK/BACK MASS\":  LIPOMA   Sign Out Date: 1/11/2023  LUCY Ferraro M.D. Number and Complexity of Problems addressed and   Risks of complications and/or morbidity of management        Back soft tissue mass along the right mid lateral back  She is S/P excision of a soft tissue mass of the back on 1/10/23  Pathology: A:  \" RIGHT FLANK/BACK MASS\":         LIPOMA     >doing well  >pathology discussed with patient and provided copy in writing  >remove today's dressings in 2 weeks at home              Level of MDM (2/3 elements below)  Number and Complexity of Problems Addressed Amount and/or Complexity of Data to be Reviewed and Analyzed  *Each unique test, order, or document contributes to the combination of 2 or combination of 3 in Category 1 below. Risk of Complications and/or Morbidity or Mortality of pt Management     [id number]  [id number] SF Minimal  ?1self-limited or minor problem Minimal or none Minimal risk of morbidity from additional diagnostic testing or Rx   [id number]  [id number] Low Low  ? 2or more self-limited or minor problems;    or  ? 1stable chronic illness;    or  ?1acute, uncomplicated illness or injury   Limited  (Must meet the requirements of at least 1 of the 2 categories)  Category 1: Tests and documents   ? Any combination of 2 from the following:  ?Review of prior external note(s) from each unique source*;  ?review of the result(s) of each unique test*; ?ordering of each unique test*    or   Category 2: Assessment requiring an independent historian(s)  (For the categories of independent interpretation of tests and discussion of management or test interpretation, see moderate or high) Low risk of morbidity from additional diagnostic testing or treatment     16953  33196 Mod Moderate  ? 1or more chronic illnesses with exacerbation, progression, or side effects of treatment;    or  ?2or more stable chronic illnesses;    or  ?1undiagnosed new problem with uncertain prognosis;    or  ?1acute illness with systemic symptoms;    or  ?1acute complicated injury   Moderate  (Must meet the requirements of at least 1 out of 3 categories)  Category 1: Tests, documents, or independent historian(s)  ? Any combination of 3 from the following:   ?Review of prior external note(s) from each unique source*;  ?Review of the result(s) of each unique test*;  ?Ordering of each unique test*;  ?Assessment requiring an independent historian(s)    or  Category 2: Independent interpretation of tests   ? Independent interpretation of a test performed by another physician/other qualified health care professional (not separately reported);     or  Category 3: Discussion of management or test interpretation  ? Discussion of management or test interpretation with external physician/other qualified health care professional/appropriate source (not separately reported)   Moderate risk of morbidity from additional diagnostic testing or treatment  Examples only:  ?Prescription drug management   ? Decision regarding minor surgery with identified patient or procedure risk factors  ? Decision regarding elective major surgery without identified patient or procedure risk factors   ? Diagnosis or treatment significantly limited by social determinants of health       [id number] High High  ? 1or more chronic illnesses with severe exacerbation, progression, or side effects of treatment;    or  ?1 acute or chronic illness or injury that poses a threat to life or bodily function   Extensive  (Must meet the requirements of at least 2 out of 3 categories)  Category 1: Tests, documents, or independent historian(s)  ? Any combination of 3 from the following:   ?Review of prior external note(s) from each unique source*;  ?Review of the result(s) of each unique test*;   ?Ordering of each unique test*;   ?Assessment requiring an independent historian(s)    or   Category 2: Independent interpretation of tests   ? Independent interpretation of a test performed by another physician/other qualified health care professional (not separately reported);     or  Category 3: Discussion of management or test interpretation  ? Discussion of management or test interpretation with external physician/other qualified health care professional/appropriate source (not separately reported)   High risk of morbidity from additional diagnostic testing or treatment  Examples only:  ?Drug therapy requiring intensive monitoring for toxicity  ? Decision regarding elective major surgery with identified patient or procedure risk factors  ? Decision regarding emergency major surgery  ? Decision regarding hospitalization  ? Decision not to resuscitate or to de-escalate care because of poor prognosis             I have personally performed a face-to-face diagnostic evaluation and management  service on this patient. I have independently seen the patient. I have independently obtained the above history from the patient/family. I have independently examined the patient with above findings. I have independently reviewed data/labs for this patient and developed the above plan of care (MDM).       Signed: JERRY Dukes CNP  1/23/2023    11:04 AM

## (undated) DEVICE — GARMENT,MEDLINE,DVT,INT,CALF,LG, GEN2: Brand: MEDLINE

## (undated) DEVICE — NEEDLE HYPO 21GA L1.5IN INTRAMUSCULAR S STL LATCH BVL UP

## (undated) DEVICE — SOLUTION IRRIG 1000ML 09% SOD CHL USP PIC PLAS CONTAINER

## (undated) DEVICE — 3M™ TEGADERM™ TRANSPARENT FILM DRESSING FRAME STYLE, 1626W, 4 IN X 4-3/4 IN (10 CM X 12 CM), 50/CT 4CT/CASE: Brand: 3M™ TEGADERM™

## (undated) DEVICE — SUTURE VCRL SZ 3-0 L18IN ABSRB UD L26MM SH 1/2 CIR J864D

## (undated) DEVICE — GLOVE SURG SZ 7 L12IN FNGR THK79MIL GRN LTX FREE

## (undated) DEVICE — MINOR SPLIT GENERAL: Brand: MEDLINE INDUSTRIES, INC.

## (undated) DEVICE — GLOVE SURG SZ 65 THK91MIL LTX FREE SYN POLYISOPRENE

## (undated) DEVICE — Device

## (undated) DEVICE — 3M™ TEGADERM™ TRANSPARENT FILM DRESSING FRAME STYLE, 1628, 6 IN X 8 IN (15 CM X 20 CM), 10/CT 8CT/CASE: Brand: 3M™ TEGADERM™

## (undated) DEVICE — GARMENT,MEDLINE,DVT,INT,CALF,MED, GEN2: Brand: MEDLINE